# Patient Record
Sex: FEMALE | Race: WHITE | Employment: OTHER | URBAN - METROPOLITAN AREA
[De-identification: names, ages, dates, MRNs, and addresses within clinical notes are randomized per-mention and may not be internally consistent; named-entity substitution may affect disease eponyms.]

---

## 2019-02-27 ENCOUNTER — HOSPITAL ENCOUNTER (OUTPATIENT)
Age: 74
Setting detail: OUTPATIENT SURGERY
Discharge: HOME | End: 2019-02-27
Attending: PODIATRIST | Admitting: PODIATRIST
Payer: MEDICARE

## 2019-02-27 VITALS
RESPIRATION RATE: 18 BRPM | DIASTOLIC BLOOD PRESSURE: 69 MMHG | BODY MASS INDEX: 29.26 KG/M2 | SYSTOLIC BLOOD PRESSURE: 154 MMHG | TEMPERATURE: 98.1 F | HEIGHT: 62 IN | OXYGEN SATURATION: 100 % | HEART RATE: 77 BPM | WEIGHT: 159 LBS

## 2019-02-27 PROBLEM — M89.8X7 EXOSTOSIS OF TOE: Chronic | Status: RESOLVED | Noted: 2019-02-27 | Resolved: 2019-02-27

## 2019-02-27 PROBLEM — M21.621 BUNIONETTE OF RIGHT FOOT: Status: RESOLVED | Noted: 2019-02-27 | Resolved: 2019-02-27

## 2019-02-27 PROBLEM — M21.621 BUNIONETTE OF RIGHT FOOT: Status: ACTIVE | Noted: 2019-02-27

## 2019-02-27 PROBLEM — M89.8X7 EXOSTOSIS OF TOE: Chronic | Status: ACTIVE | Noted: 2019-02-27

## 2019-02-27 PROCEDURE — 77030004410 HC BUR OVL MCRA -B: Performed by: PODIATRIST

## 2019-02-27 PROCEDURE — 76210000050 HC AMBSU PH II REC 0.5 TO 1 HR: Performed by: PODIATRIST

## 2019-02-27 PROCEDURE — 77030032490 HC SLV COMPR SCD KNE COVD -B: Performed by: PODIATRIST

## 2019-02-27 PROCEDURE — 74011000250 HC RX REV CODE- 250: Performed by: PODIATRIST

## 2019-02-27 PROCEDURE — 77030011640 HC PAD GRND REM COVD -A: Performed by: PODIATRIST

## 2019-02-27 PROCEDURE — 76030000001 HC AMB SURG OR TIME 1 TO 1.5: Performed by: PODIATRIST

## 2019-02-27 PROCEDURE — 77030018836 HC SOL IRR NACL ICUM -A: Performed by: PODIATRIST

## 2019-02-27 PROCEDURE — 74011250636 HC RX REV CODE- 250/636: Performed by: PODIATRIST

## 2019-02-27 PROCEDURE — 77030031139 HC SUT VCRL2 J&J -A: Performed by: PODIATRIST

## 2019-02-27 PROCEDURE — 77030002916 HC SUT ETHLN J&J -A: Performed by: PODIATRIST

## 2019-02-27 PROCEDURE — 77030006831 HC BLD SAW SAG MCRA -B: Performed by: PODIATRIST

## 2019-02-27 RX ORDER — LEVOTHYROXINE SODIUM 100 UG/1
50 TABLET ORAL
COMMUNITY

## 2019-02-27 RX ORDER — ROSUVASTATIN CALCIUM 10 MG/1
10 TABLET, COATED ORAL
COMMUNITY

## 2019-02-27 RX ORDER — DEXAMETHASONE SODIUM PHOSPHATE 4 MG/ML
INJECTION, SOLUTION INTRA-ARTICULAR; INTRALESIONAL; INTRAMUSCULAR; INTRAVENOUS; SOFT TISSUE AS NEEDED
Status: DISCONTINUED | OUTPATIENT
Start: 2019-02-27 | End: 2019-02-27 | Stop reason: HOSPADM

## 2019-02-27 RX ORDER — ASPIRIN 81 MG/1
TABLET ORAL DAILY
COMMUNITY

## 2019-02-27 RX ORDER — DM/PE/ACETAMINOPHEN/CHLORPHENR 10-5-325-2
1500 TABLET, SEQUENTIAL ORAL DAILY
COMMUNITY

## 2019-02-27 RX ORDER — LORATADINE 10 MG/1
10 TABLET ORAL
COMMUNITY

## 2019-02-27 RX ORDER — RANITIDINE 150 MG/1
150 CAPSULE ORAL
COMMUNITY

## 2019-02-27 RX ORDER — LISINOPRIL 5 MG/1
5 TABLET ORAL DAILY
COMMUNITY
End: 2021-06-28

## 2019-02-27 NOTE — BRIEF OP NOTE
BRIEF OPERATIVE NOTE    Date of Procedure: 2/27/2019   Preoperative Diagnosis: BUNIONETTE, RIGHT FIFTH TOE  Postoperative Diagnosis: BUNIONETTE, RIGHT FIFTH TOE    Procedure(s):  BUNIONETTE RESECTION 5TH METATARSAL RIGHT FOOT, Exostectomy right fifth toe  Surgeon(s) and Role:     * Isa Lopez DPM - Primary         Surgical Assistant: none    Surgical Staff:  Circ-1: Isaiah Andrews RN  Local Nurse Monitor: Katya Hicks RN  Scrub Tech-1: Shirlene LUIS  Event Time In Time Out   Incision Start 1434    Incision Close 1511      Anesthesia: Local   Estimated Blood Loss: minimal  Specimens: * No specimens in log *   Findings: s/a   Complications: none  Implants: * No implants in log *

## 2019-02-27 NOTE — ROUTINE PROCESS
Patient: Jb Nash MRN: [id number]  SSN: [id number]   YOB: 1945  Age: 74 y.o. Sex: female     Patient is status post Procedure(s):  BUNIONETTE RESECTION 5TH METATARSAL RIGHT FOOT, Exostectomy right fifth toe.     Surgeon(s) and Role:     * LINSEY CowanM - Primary    Local/Dose/Irrigation:  SEE MAR                                         Dressing/Packing:  Wound Foot Right-Dressing Type : (betadine soaked adaptice, 4x4, raphael X 2, ace) (02/27/19 1300)  Splint/Cast:  ]    Other:

## 2019-02-27 NOTE — DISCHARGE INSTRUCTIONS
Flora Albert, MILLY  [address], [address]  Phone [phone] - Fax [phone]    AFTER YOUR SURGERY    CONGRATULATIONS! You have gone through a difficult ordeal.  However, we hope this will mean a marked improvement in the function of your feet. Your help is now needed to speed the healing process by carefully following these instructions. 1. If you have a problem, call the office. We want you to be comfortable. 2. Return home immediately. Although foot is numb and no pain felt, any undue used of foot results in unnecessary bleeding, post operative pain and delayed healing. 3. Rest as much as possible for 2-3 days. You deserve it. Do not work, drive or operate machinery. 4. Elevate the foot. Do not let it dangle; that will cause it to swell. Decrease walking and standing as much as possible; they cause swelling also. 5. An ice bag may be used to help control any discomfort. Place ice pack over the ankle for 15 minute intervals. Do not allow the ice pack to leak water on the bandages. 6. Call the office day or night if:  1. Bleeding is active or persistent. If a little bleeding comes through the bandage, do not worry. 2. You should bump or injure your foot. 3. If medication does not relieve discomfort. 4. If your toes appear blue or feel cold. 7. Do not loosen or remove or alter surgical dressings under any circumstances without first consulting the doctor - do not bathe foot or allow bandage to become wet - this may result in infection or retard healing. 8. When you go to sleep, lossen the be sheets. You may want to lay a box on its side and place your foot inside of it. This way your sheet will not irritate the surgical site. 9. Numbness can last from 6-40 hours. 10. Eat a well balanced diet and drink fruit juices for the next three weeks. Avoid excessive salt and salty food; they cause swelling.   11. For any questions you may have, remember we are as near as the telephone. DO NOT HESITATE TO CALL IF YOUR FEEL SOMETHING IS WRONG. PLEASE. Call [phone]. After business hours please call my cell phone [phone].    ______________________________________________________________________    Anesthesia Discharge Instructions    After general anesthesia or intervenous sedation, for 24 hours or while taking prescription Narcotics:  · Limit your activities  · Do not drive or operate hazardous machinery  · If you have not urinated within 8 hours after discharge, please contact your surgeon on call. · Do not make important personal or business decisions  · Do not drink alcoholic beverages    Report the following to your surgeon:  · Excessive pain, swelling, redness or odor of or around the surgical area  · Temperature over 100.5 degrees  · Nausea and vomiting lasting longer than 4 hours or if unable to take medication  · Any signs of decreased circulation or nerve impairment to extremity:  Change in color, persistent numbness, tingling, coldness or increased pain.   · Any questions

## 2019-02-28 NOTE — OP NOTES
[address]   OPERATIVE REPORT    Name:  Jeremie Martin  MR#:  [id number]  :  1945  ACCOUNT #:  [de-identified]  DATE OF SERVICE:  2019    SITE:  [facility]    PREOPERATIVE DIAGNOSES:  Right foot bunionette deformity and exostosis, fifth toe of the right foot. POSTOPERATIVE DIAGNOSES:  Right foot bunionette deformity and exostosis, fifth toe of the right foot. PROCEDURE PERFORMED:  Resection of bunionette-overbone fifth metatarsal right and exostosectomy, fifth toe right. SURGEON:  Cristina Ward DPM    ASSISTANT:  None. ANESTHESIA:  Local consisting of 0.25% Marcaine with 1% lidocaine with a total dilution of epinephrine of 1:400,000. COMPLICATIONS:  None. SPECIMENS REMOVED:  None. IMPLANTS:  None. ESTIMATED BLOOD LOSS:  Minimal.    INDICATIONS:  The patient has presented with history of painful bunionette and digital exostosis, fifth toe of the right foot. She has pain with conventional shoe gear which has progressively gotten worse. Definitive care has been requested. Surgery was discussed in detail including a review of the preoperative x-rays and planned procedure. Risks, complications, and postoperative course were fully discussed with the patient. The patient had the opportunity to ask questions and all questions were answered to her satisfaction. PROCEDURE:  The patient was brought to the operating room and placed in the supine position. After the patient was locally anesthetized, the right foot was prepped and draped in the usual aseptic sterile manner. Attention was directed to the dorsal aspect of the fifth metatarsophalangeal joint, where a 2.5 cm curvilinear incision was placed overlying the head of the metatarsal.  The incision was deepened via sharp dissection. All bleeders were encountered, were clamped and ligated as necessary. All neurovascular structures were meticulously identified, preserved, and retracted.   The capsular tissue overlying the metatarsophalangeal joint was incised in a linear fashion. The upper bone of the fifth metatarsal was resected with sagittal saw. All bony parameters including lateral bone of fifth metatarsal and intermetatarsal angle were within normal limits. All remaining bone was rasped smooth. The wound was then flushed copiously with sterile saline. The capsular tissue was reapproximated in the bias fashion from distal lateral to proximal medial to reinforce the joint laterally. The subcutaneous tissues were reapproximated with the use of 4-0 Vicryl and the skin was closed with the use of 4-0 nylon suture. Attention was now directed to the dorsal aspect of the fifth toe, where a 0.5 cm longitudinal incision was placed directly overlying the palpable bony prominence at the distal joint. The incision was deepened via sharp dissection. With the use of a [address] [place] blade, the bony prominence was meticulously exposed and reduced with a bone file. The wound was then flushed copiously with sterile saline and closed with 4-0 nylon suture. The wounds were then dressed with Betadine impregnated Adaptic gauze and clean dressing. Both oral and written postoperative instructions were given to the patient. A surgical shoe was dispensed and prescription for Norco 5 mg p.r.n. pain was prescribed. Followup will be in the office.       Man Huang DPM [id number]  D:  02/28/2019 6:32  T:  02/28/2019 13:05  JOB #:  [id number]  CC:  Juanita Burdick MD

## 2019-11-15 ENCOUNTER — HOSPITAL ENCOUNTER (OUTPATIENT)
Dept: GENERAL RADIOLOGY | Age: 74
Discharge: HOME | End: 2019-11-15
Attending: FAMILY MEDICINE
Payer: MEDICARE

## 2019-11-15 DIAGNOSIS — R05.9 COUGH: ICD-10-CM

## 2019-11-15 PROCEDURE — 71046 X-RAY EXAM CHEST 2 VIEWS: CPT

## 2021-06-22 ENCOUNTER — OFFICE VISIT (OUTPATIENT)
Dept: CARDIOLOGY CLINIC | Age: 76
End: 2021-06-22
Payer: MEDICARE

## 2021-06-22 VITALS
OXYGEN SATURATION: 99 % | RESPIRATION RATE: 16 BRPM | BODY MASS INDEX: 29.41 KG/M2 | HEIGHT: 63 IN | SYSTOLIC BLOOD PRESSURE: 128 MMHG | HEART RATE: 70 BPM | WEIGHT: 166 LBS | DIASTOLIC BLOOD PRESSURE: 80 MMHG

## 2021-06-22 DIAGNOSIS — Z82.49 FAMILY HISTORY OF EARLY CAD: ICD-10-CM

## 2021-06-22 DIAGNOSIS — I10 BENIGN ESSENTIAL HTN: ICD-10-CM

## 2021-06-22 DIAGNOSIS — I20.0 UNSTABLE ANGINA (HCC): Primary | ICD-10-CM

## 2021-06-22 DIAGNOSIS — R00.2 PALPITATIONS: ICD-10-CM

## 2021-06-22 PROCEDURE — G8536 NO DOC ELDER MAL SCRN: HCPCS | Performed by: INTERNAL MEDICINE

## 2021-06-22 PROCEDURE — 93005 ELECTROCARDIOGRAM TRACING: CPT | Performed by: INTERNAL MEDICINE

## 2021-06-22 PROCEDURE — G8400 PT W/DXA NO RESULTS DOC: HCPCS | Performed by: INTERNAL MEDICINE

## 2021-06-22 PROCEDURE — G8510 SCR DEP NEG, NO PLAN REQD: HCPCS | Performed by: INTERNAL MEDICINE

## 2021-06-22 PROCEDURE — 93010 ELECTROCARDIOGRAM REPORT: CPT | Performed by: INTERNAL MEDICINE

## 2021-06-22 PROCEDURE — G8754 DIAS BP LESS 90: HCPCS | Performed by: INTERNAL MEDICINE

## 2021-06-22 PROCEDURE — 99204 OFFICE O/P NEW MOD 45 MIN: CPT | Performed by: INTERNAL MEDICINE

## 2021-06-22 PROCEDURE — G8752 SYS BP LESS 140: HCPCS | Performed by: INTERNAL MEDICINE

## 2021-06-22 PROCEDURE — G8427 DOCREV CUR MEDS BY ELIG CLIN: HCPCS | Performed by: INTERNAL MEDICINE

## 2021-06-22 PROCEDURE — 1090F PRES/ABSN URINE INCON ASSESS: CPT | Performed by: INTERNAL MEDICINE

## 2021-06-22 PROCEDURE — G8419 CALC BMI OUT NRM PARAM NOF/U: HCPCS | Performed by: INTERNAL MEDICINE

## 2021-06-22 PROCEDURE — G0463 HOSPITAL OUTPT CLINIC VISIT: HCPCS | Performed by: INTERNAL MEDICINE

## 2021-06-22 PROCEDURE — 1101F PT FALLS ASSESS-DOCD LE1/YR: CPT | Performed by: INTERNAL MEDICINE

## 2021-06-22 RX ORDER — LEVOTHYROXINE SODIUM 50 UG/1
TABLET ORAL
COMMUNITY
Start: 2021-04-05

## 2021-06-22 RX ORDER — LORATADINE 10 MG/1
TABLET ORAL
COMMUNITY
Start: 2021-04-05

## 2021-06-22 RX ORDER — LOSARTAN POTASSIUM 50 MG/1
TABLET ORAL
COMMUNITY
Start: 2021-04-05

## 2021-06-22 RX ORDER — ASPIRIN/OMEPRAZOLE 81 MG-40MG
TABLET,IMMEDIATE,DELAY RELEASE,BIPHASE ORAL
COMMUNITY

## 2021-06-22 RX ORDER — ROSUVASTATIN CALCIUM 10 MG/1
TABLET, FILM COATED ORAL
COMMUNITY
Start: 2021-04-05

## 2021-06-22 RX ORDER — BESIFLOXACIN 6 MG/ML
SUSPENSION OPHTHALMIC
COMMUNITY
Start: 2021-05-04

## 2021-06-22 RX ORDER — CHOLECALCIFEROL TAB 125 MCG (5000 UNIT) 125 MCG
5000 TAB ORAL DAILY
COMMUNITY

## 2021-06-22 NOTE — PROGRESS NOTES
[address]: Harris Amber  [id number]    History of Present Illness:  Ms. Raimundo Camacho is a 63 yo F with history of hypertension, hypothyroid, family history of early coronary artery disease, remote tobacco use quit many years ago, sounds like possible TIA three years ago, referred by her primary care for cardiac evaluation. She does have upcoming cataract surgery on 07/07/2021 and 07/21/2021 with Dr. Safia Mora. She is here due to progressive shortness of breath over the last few months, predominately when she is going up stairs and sometimes when she is swimming in the pool. She denies any dung chest pain. She just has rare palpitations with heart \"fluttering\" once every few months. They did think possibly in the past she had atrial fibrillation, but this was never documented or found. She denies any prior cardiac history, but has family history of early coronary artery disease. She is compensated on exam with clear lungs and no lower extremity edema. Soc hx. Quit tobacco 30 yrs ago. Retired,  was in the [employer]. Fam hx. Brother 62s CAD, younger brother CAD. Assessment and Plan:   1. Preoperative cardiac evaluation. Overall, she is stable cardiac wise and low risk for cardiac complications. Will send a clearance letter to Dr. Safia Mora and Dr. Aarti Diaz. She can proceed with her cataract surgery. 2. Unstable angina. She does have some dyspnea on exertion, though that is unexplained and will set up a treadmill stress echocardiogram for further evaluation. I do not think that this needs to be done prior to surgery, but regardless will try to schedule it within the next week. So this will be completed prior to surgery. 3. Palpitations. These are rare and infrequent. If in the future this becomes more consistent then could always consider heart monitor, but again this is occurring once every few months. 4. Essential hypertension. Blood pressure is controlled.     5. Family history of early coronary artery disease. She  has no past medical history on file. All other systems negative except as above. PE  Vitals:    06/22/21 0907   Pulse: 70   Resp: 16   SpO2: 99%   Weight: 166 lb (75.3 kg)   Height: 5' 2.5\" (1.588 m)    Body mass index is 29.88 kg/m². General appearance - alert, well appearing, and in no distress  Mental status - affect appropriate to mood  Eyes - sclera anicteric, moist mucous membranes  Neck - supple, no JVD  Chest - clear to auscultation, no wheezes, rales or rhonchi  Heart - normal rate, regular rhythm, normal S1, S2, no murmurs, rubs, clicks or gallops  Abdomen - soft, nontender, nondistended, no masses or organomegaly  Neurological -  no focal deficit  Extremities - peripheral pulses normal, no pedal edema      Recent Labs:  No results found for: CHOL, CHOLX, CHLST, CHOLV, 607828, HDL, HDLP, LDL, LDLC, DLDLP, TGLX, TRIGL, TRIGP, CHHD, CHHDX  No results found for: KAVYA, CREAPOC, ACREA, CREA, REFC3, REFC4  No results found for: BUN, BUNPOC, IBUN, MBUNV, BUNV  No results found for: K, KI, PLK, WBK  No results found for: HBA1C, HZZ8HLLQ, DBA3XHQC  No results found for: HGBPOC, HGB, HGBP, HGBEXT, HGBEXT  No results found for: PLT, PLTEXT, PLTEXT    Reviewed:  No past medical history on file. Social History     Tobacco Use   Smoking Status Former Smoker    Types: Cigarettes   Smokeless Tobacco Never Used     Social History     Substance and Sexual Activity   Alcohol Use None     Not on File    Current Outpatient Medications   Medication Sig    Besivance 0.6 % drps ophthalmic suspension     levothyroxine (SYNTHROID) 50 mcg tablet     loratadine (CLARITIN) 10 mg tablet     Crestor 10 mg tablet     losartan (COZAAR) 50 mg tablet     cholecalciferol (VITAMIN D3) (5000 Units/125 mcg) tab tablet Take 5,000 Units by mouth daily.  aspirin-omeprazole 81-40 mg TbID Take  by mouth.     fish oil-omega-3 fatty acids 340-1,000 mg capsule Take 1 Capsule by mouth daily.    Bifidobacterium infantis (ALIGN PO) Take  by mouth. No current facility-administered medications for this visit.        Lucinda Mao MD  [facility] heart and Vascular [place]  [address] [facility] [address]  [address]

## 2021-06-22 NOTE — LETTER
Patient:  Shekhar Nuno YOB: 1945 Date of Visit: 6/22/2021 Dear MD Nathan Perdomo  [address] [place] [address] [address] Fax: [phone] Pia Hurst MD 
[address] [facility] [address] [facility] [address] Via Fax: [phone]: Thank you for referring Ms. Shekhar Nuno to me for evaluation/treatment. Below are the relevant portions of my assessment and plan of care. Ms. Claudy Vines is a 63 yo F with history of hypertension, hypothyroid, family history of early coronary artery disease, remote tobacco use quit many years ago, sounds like possible TIA three years ago, referred by her primary care for cardiac evaluation. She does have upcoming cataract surgery on 07/07/2021 and 07/21/2021 with Dr. Pia Hurst. She is here due to progressive shortness of breath over the last few months, predominately when she is going up stairs and sometimes when she is swimming in the pool. She denies any dung chest pain. She just has rare palpitations with heart \"fluttering\" once every few months. They did think possibly in the past she had atrial fibrillation, but this was never documented or found. She denies any prior cardiac history, but has family history of early coronary artery disease. She is compensated on exam with clear lungs and no lower extremity edema. Soc hx. Quit tobacco 30 yrs ago. Retired,  was in the [employer]. Fam hx. Brother 62s CAD, younger brother CAD. Assessment and Plan: 1. Preoperative cardiac evaluation. Overall, she is stable cardiac wise and low risk for cardiac complications. Will send a clearance letter to Dr. Pia Hurst and Dr. Hair Edward. She can proceed with her cataract surgery. 2. Unstable angina. She does have some dyspnea on exertion, though that is unexplained and will set up a treadmill stress echocardiogram for further evaluation.   I do not think that this needs to be done prior to surgery, but regardless will try to schedule it within the next week. So this will be completed prior to surgery. 3. Palpitations. These are rare and infrequent. If in the future this becomes more consistent then could always consider heart monitor, but again this is occurring once every few months. 4. Essential hypertension. Blood pressure is controlled. 5. Family history of early coronary artery disease. If you have questions, please do not hesitate to call me. Sincerely, Emmy Gilman MD

## 2021-06-28 ENCOUNTER — HOSPITAL ENCOUNTER (OUTPATIENT)
Dept: NON INVASIVE DIAGNOSTICS | Age: 76
Discharge: HOME | End: 2021-06-28
Attending: INTERNAL MEDICINE
Payer: MEDICARE

## 2021-06-28 VITALS — HEIGHT: 63 IN | BODY MASS INDEX: 29.41 KG/M2 | WEIGHT: 166 LBS

## 2021-06-28 DIAGNOSIS — I20.0 UNSTABLE ANGINA (HCC): ICD-10-CM

## 2021-06-28 LAB
ECHO TV REGURGITANT MAX VELOCITY: 216.36 CM/S
ECHO TV REGURGITANT PEAK GRADIENT: 18.72 MMHG
STRESS ANGINA INDEX: 0
STRESS BASELINE DIAS BP: 77 MMHG
STRESS BASELINE HR: 72 BPM
STRESS BASELINE SYS BP: 157 MMHG
STRESS ESTIMATED WORKLOAD: 5.1 METS
STRESS EXERCISE DUR MIN: NORMAL
STRESS PEAK DIAS BP: 90 MMHG
STRESS PEAK SYS BP: 166 MMHG
STRESS PERCENT HR ACHIEVED: 106 %
STRESS POST PEAK HR: 151 BPM
STRESS RATE PRESSURE PRODUCT: NORMAL BPM*MMHG
STRESS STAGE 1 BP: NORMAL MMHG
STRESS STAGE 1 HR: 144 BPM
STRESS STAGE 2 HR: 148 BPM
STRESS STAGE RECOVERY 1 BP: NORMAL MMHG
STRESS STAGE RECOVERY 1 HR: 72 BPM
STRESS TARGET HR: 142 BPM

## 2021-06-28 PROCEDURE — 93320 DOPPLER ECHO COMPLETE: CPT

## 2021-06-28 RX ORDER — HYOSCYAMINE SULFATE 0.12 MG/1
0.12 TABLET SUBLINGUAL
COMMUNITY

## 2021-07-02 ENCOUNTER — TELEPHONE (OUTPATIENT)
Dept: CARDIOLOGY CLINIC | Age: 76
End: 2021-07-02

## 2021-07-02 NOTE — TELEPHONE ENCOUNTER
----- Message from Julia He MD sent at 6/28/2021  2:22 PM EDT -----  Please let pt know stress test was overall normal. Recommend follow up in 6-12 months for surveillance.  thx

## 2021-07-02 NOTE — TELEPHONE ENCOUNTER
Patient returned call. Two patient indentifiers verified. Pt was informed of results. Pt stated that she will call back to scheduled follow up appointment.

## 2022-11-30 ENCOUNTER — OFFICE VISIT (OUTPATIENT)
Dept: CARDIOLOGY CLINIC | Age: 77
End: 2022-11-30
Payer: MEDICARE

## 2022-11-30 VITALS
BODY MASS INDEX: 29.45 KG/M2 | HEIGHT: 63 IN | WEIGHT: 166.2 LBS | DIASTOLIC BLOOD PRESSURE: 86 MMHG | RESPIRATION RATE: 14 BRPM | OXYGEN SATURATION: 98 % | SYSTOLIC BLOOD PRESSURE: 132 MMHG

## 2022-11-30 DIAGNOSIS — Z82.49 FAMILY HISTORY OF EARLY CAD: ICD-10-CM

## 2022-11-30 DIAGNOSIS — R00.2 HEART PALPITATIONS: Primary | ICD-10-CM

## 2022-11-30 DIAGNOSIS — I10 BENIGN ESSENTIAL HTN: ICD-10-CM

## 2022-11-30 RX ORDER — LEVOTHYROXINE SODIUM 75 UG/1
TABLET ORAL
COMMUNITY

## 2022-11-30 NOTE — LETTER
Patient:  Asia Baez   YOB: 1945  Date of Visit: 11/30/2022      Dear MD Nilay [address] [address]  Via Fax: [phone]:      Ms. Dipti Costa is a 75 yo F with history of hypertension, hypothyroid, family history of early coronary artery disease, remote tobacco use quit many years ago, sounds like possible TIA three years ago. She is here now due to palpitations. They have been occurring a few times over this past week. In particular on 11/20/2022 she awoke and had heavy \"fluttering\" that lasted about an hour. Since then, she has had some recurrent spells. These can happen with no clear triggers or exacerbating factors. She denies any exertional chest pain. Her breathing has been stable. She does go to the gym five days a week. 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 She also saw ENT. She is compensated on exam with clear lungs and no lower extremity edema. Her EKG here is normal sinus rhythm. There is a single PVC. Soc hx. Quit tobacco 30 yrs ago. Retired,  was in the [employer]. Fam hx. Brother 62s CAD, younger brother CAD. Assessment and Plan:   1. Palpitations. Will have her wear a one week loop monitor to evaluate for possible arrhythmia. She did have a PVC on her EKG here, but this was asymptomatic. 2. Essential hypertension. Blood pressure is controlled. 3. Family history of early coronary artery disease. If you have questions, please do not hesitate to call me.      Sincerely,      Wilmar Cardona MD

## 2022-11-30 NOTE — PROGRESS NOTES
[address]: Winnie [place]  ([phone]    History of Present Illness:  Ms. Mihaela Sunshine is a 75 yo F with history of hypertension, hypothyroid, family history of early coronary artery disease, remote tobacco use quit many years ago, sounds like possible TIA three years ago. She is here now due to palpitations. They have been occurring a few times over this past week. In particular on 11/20/2022 she awoke and had heavy \"fluttering\" that lasted about an hour. Since then, she has had some recurrent spells. These can happen with no clear triggers or exacerbating factors. She denies any exertional chest pain. Her breathing has been stable. She does go to the gym five days a week. 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 She also saw ENT. She is compensated on exam with clear lungs and no lower extremity edema. Her EKG here is normal sinus rhythm. There is a single PVC. Soc hx. Quit tobacco 30 yrs ago. Retired,  was in the [employer]. Fam hx. Brother 62s CAD, younger brother CAD. Assessment and Plan:   1. Palpitations. Will have her wear a one week loop monitor to evaluate for possible arrhythmia. She did have a PVC on her EKG here, but this was asymptomatic. 2. Essential hypertension. Blood pressure is controlled. 3. Family history of early coronary artery disease. She  has a past medical history of Bunionette of right foot, Exostosis of toe (2/27/2019), GERD (gastroesophageal reflux disease), and Hypertension. All other systems negative except as above. PE  Vitals:    11/30/22 1150   BP: 132/86   Resp: 14   SpO2: 98%   Weight: 166 lb 3.2 oz (75.4 kg)   Height: 5' 2.5\" (1.588 m)      Body mass index is 29.91 kg/m².    General appearance - alert, well appearing, and in no distress  Mental status - affect appropriate to mood  Eyes - sclera anicteric, moist mucous membranes  Neck - supple, no JVD  Chest - clear to auscultation, no wheezes, rales or rhonchi  Heart - normal rate, regular rhythm, normal S1, S2, no murmurs, rubs, clicks or gallops  Abdomen - soft, nontender, nondistended, no masses or organomegaly  Neurological -  no focal deficit  Extremities - peripheral pulses normal, no pedal edema      Recent Labs:  No results found for: CHOL, CHOLX, CHLST, CHOLV, 439724, HDL, HDLP, LDL, LDLC, DLDLP, TGLX, TRIGL, TRIGP, CHHD, CHHDX  No results found for: KAVYA, CREAPOC, [facility], CREA, REFC3, REFC4  No results found for: BUN, BUNPOC, IBUN, MBUNV, BUNV  No results found for: K, KI, PLK, WBK  No results found for: HBA1C, OIU0PNVJ, FKQ2UDCV  No results found for: HGBPOC, HGB, HGBP, HGBEXT, HGBEXT  No results found for: PLT, PLTEXT, PLTEXT    Reviewed:  Past Medical History:   Diagnosis Date    Bunionette of right foot     Exostosis of toe 2/27/2019    GERD (gastroesophageal reflux disease)     Hypertension      Social History     Tobacco Use   Smoking Status Former    Types: Cigarettes   Smokeless Tobacco Never     Social History     Substance and Sexual Activity   Alcohol Use Yes     No Known Allergies    Current Outpatient Medications   Medication Sig    levothyroxine (SYNTHROID) 75 mcg tablet Take  by mouth Daily (before breakfast). hyoscyamine SL (LEVSIN/SL) 0.125 mg SL tablet 0.125 mg by SubLINGual route every four (4) hours as needed. rifAXIMin (XIFAXAN) 550 mg tablet Take 550 mg by mouth three (3) times daily. Besivance 0.6 % drps ophthalmic suspension     Crestor 10 mg tablet     losartan (COZAAR) 50 mg tablet     cholecalciferol (VITAMIN D3) (5000 Units/125 mcg) tab tablet Take 5,000 Units by mouth daily. aspirin-omeprazole 81-40 mg TbID Take  by mouth. Bifidobacterium infantis (ALIGN PO) Take  by mouth.    loratadine (CLARITIN) 10 mg tablet Take 10 mg by mouth. rosuvastatin (CRESTOR) 10 mg tablet Take 10 mg by mouth every three (3) days.     raNITIdine hcl 150 mg capsule Take 150 mg by mouth daily as needed for Indigestion. aspirin delayed-release 81 mg tablet Take  by mouth daily. fish oil-omega-3 fatty acids 340-1,000 mg capsule Take 1 Cap by mouth daily. glucosamine 1,000 mg tab Take 1,500 mg by mouth daily. levothyroxine (SYNTHROID) 50 mcg tablet     loratadine (CLARITIN) 10 mg tablet  (Patient not taking: Reported on 11/30/2022)    fish oil-omega-3 fatty acids 340-1,000 mg capsule Take 1 Capsule by mouth daily. (Patient not taking: Reported on 11/30/2022)    levothyroxine (SYNTHROID) 100 mcg tablet Take 50 mcg by mouth Daily (before breakfast). No current facility-administered medications for this visit.        Silvia Diop MD  [facility] heart and Vascular [place]  [facility] [address] [facility] [address]  [address]

## 2022-11-30 NOTE — PATIENT INSTRUCTIONS
A 7 day loop monitor has been ordered for you. This will be mailed to the address given to us in the next 5-7 business days. Please read over the instructions given to you about [employer] loop monitors. If you have any insurance questions regarding coverage and out of pocket cost please contact the number below. If you have any billing questions regarding deductible or responsibility call [phone]. If you need additional supplies or issue with your monitor please call [phone].

## 2022-12-01 ENCOUNTER — TELEPHONE (OUTPATIENT)
Dept: CARDIOLOGY CLINIC | Age: 77
End: 2022-12-01

## 2022-12-01 NOTE — TELEPHONE ENCOUNTER
Enrolled with [employer] - Ordered and being shipped to patient's home address on file. ETA within 5-7 business days. Message  Received: Chari Mcleod, RN  Elke Pineda  Please send a 1 week loop monitor for palps. . thanks!

## 2022-12-29 ENCOUNTER — TELEPHONE (OUTPATIENT)
Dept: CARDIOLOGY CLINIC | Age: 77
End: 2022-12-29

## 2022-12-29 NOTE — TELEPHONE ENCOUNTER
Left message for patient to return call in regards to recent testing. Please let pt know holter was overall normal. Just rare to occasional benign PVCs. No specific therapy needed for these.  thx

## 2022-12-29 NOTE — TELEPHONE ENCOUNTER
Pt rtc to the medical assistant or nurse.     [phone] PAST MEDICAL HISTORY:  Endometriosis     Miscarriage     Ovarian cyst     Urinary tract infection associated with cystostomy catheter, sequela

## 2023-01-30 ENCOUNTER — TELEPHONE (OUTPATIENT)
Dept: CARDIOLOGY CLINIC | Age: 78
End: 2023-01-30

## 2023-01-30 NOTE — TELEPHONE ENCOUNTER
Received request to fax office note of 11-30-22 to Sylwia Chavira MD @ [phone]. Confirmation received.

## 2023-11-16 ENCOUNTER — TRANSCRIBE ORDERS (OUTPATIENT)
Facility: HOSPITAL | Age: 78
End: 2023-11-16

## 2023-11-16 DIAGNOSIS — M79.671 RIGHT FOOT PAIN: ICD-10-CM

## 2023-11-16 DIAGNOSIS — M79.672 LEFT FOOT PAIN: Primary | ICD-10-CM

## 2023-11-16 DIAGNOSIS — M76.812 ANTERIOR TIBIAL TENDONITIS, LEFT: ICD-10-CM

## 2023-11-17 ENCOUNTER — HOSPITAL ENCOUNTER (OUTPATIENT)
Facility: HOSPITAL | Age: 78
End: 2023-11-17
Attending: PODIATRIST
Payer: MEDICARE

## 2023-11-17 DIAGNOSIS — M76.812 ANTERIOR TIBIAL TENDONITIS, LEFT: ICD-10-CM

## 2023-11-17 DIAGNOSIS — M79.671 RIGHT FOOT PAIN: ICD-10-CM

## 2023-11-17 DIAGNOSIS — M79.672 LEFT FOOT PAIN: ICD-10-CM

## 2023-11-17 PROCEDURE — 73718 MRI LOWER EXTREMITY W/O DYE: CPT

## 2024-04-16 PROBLEM — Z87.19 HISTORY OF GASTROESOPHAGEAL REFLUX (GERD): Status: ACTIVE | Noted: 2024-04-16

## 2024-04-16 PROBLEM — E07.9 THYROID DISEASE: Status: ACTIVE | Noted: 2024-04-16

## 2024-04-16 PROBLEM — E78.00 HYPERCHOLESTEROLEMIA: Status: ACTIVE | Noted: 2024-04-16

## 2024-04-16 PROBLEM — I25.10 CARDIOVASCULAR DISEASE: Status: ACTIVE | Noted: 2024-04-16

## 2024-04-16 PROBLEM — M17.11 OSTEOARTHRITIS OF RIGHT KNEE: Status: ACTIVE | Noted: 2024-04-16

## 2024-05-21 ENCOUNTER — TRANSCRIBE ORDERS (OUTPATIENT)
Facility: HOSPITAL | Age: 79
End: 2024-05-21

## 2024-05-21 ENCOUNTER — HOSPITAL ENCOUNTER (OUTPATIENT)
Facility: HOSPITAL | Age: 79
Discharge: HOME | End: 2024-05-24
Payer: MEDICARE

## 2024-05-21 DIAGNOSIS — M54.50 LOW BACK PAIN, UNSPECIFIED BACK PAIN LATERALITY, UNSPECIFIED CHRONICITY, UNSPECIFIED WHETHER SCIATICA PRESENT: Primary | ICD-10-CM

## 2024-05-21 DIAGNOSIS — M54.50 LOW BACK PAIN, UNSPECIFIED BACK PAIN LATERALITY, UNSPECIFIED CHRONICITY, UNSPECIFIED WHETHER SCIATICA PRESENT: ICD-10-CM

## 2024-05-21 PROCEDURE — 72220 X-RAY EXAM SACRUM TAILBONE: CPT

## 2024-09-13 ENCOUNTER — HOSPITAL ENCOUNTER (OUTPATIENT)
Facility: HOSPITAL | Age: 79
Discharge: HOME | End: 2024-09-16
Payer: MEDICARE

## 2024-09-13 DIAGNOSIS — Z12.31 SCREENING MAMMOGRAM FOR HIGH-RISK PATIENT: ICD-10-CM

## 2024-09-13 PROCEDURE — 77063 BREAST TOMOSYNTHESIS BI: CPT

## 2024-09-25 ENCOUNTER — TRANSCRIBE ORDERS (OUTPATIENT)
Facility: HOSPITAL | Age: 79
End: 2024-09-25

## 2024-09-25 DIAGNOSIS — Z12.31 VISIT FOR SCREENING MAMMOGRAM: Primary | ICD-10-CM

## 2025-01-22 ENCOUNTER — HOSPITAL ENCOUNTER (OUTPATIENT)
Facility: HOSPITAL | Age: 80
Setting detail: OBSERVATION
Discharge: HOME | End: 2025-01-23
Attending: STUDENT IN AN ORGANIZED HEALTH CARE EDUCATION/TRAINING PROGRAM | Admitting: FAMILY MEDICINE
Payer: MEDICARE

## 2025-01-22 ENCOUNTER — APPOINTMENT (OUTPATIENT)
Facility: HOSPITAL | Age: 80
End: 2025-01-22
Payer: MEDICARE

## 2025-01-22 DIAGNOSIS — R55 NEAR SYNCOPE: Primary | ICD-10-CM

## 2025-01-22 LAB
ALBUMIN SERPL-MCNC: 3.7 G/DL (ref 3.5–5)
ALBUMIN/GLOB SERPL: 1.3 (ref 1.1–2.2)
ALP SERPL-CCNC: 52 U/L (ref 45–117)
ALT SERPL-CCNC: 35 U/L (ref 12–78)
ANION GAP SERPL CALC-SCNC: 14 MMOL/L (ref 2–12)
APPEARANCE UR: CLEAR
AST SERPL-CCNC: 19 U/L (ref 15–37)
BACTERIA URNS QL MICRO: NEGATIVE /HPF
BASOPHILS # BLD: 0.04 K/UL (ref 0–0.1)
BASOPHILS NFR BLD: 0.4 % (ref 0–1)
BILIRUB SERPL-MCNC: 0.7 MG/DL (ref 0.2–1)
BILIRUB UR QL: NEGATIVE
BUN SERPL-MCNC: 33 MG/DL (ref 6–20)
BUN/CREAT SERPL: 36 (ref 12–20)
CALCIUM SERPL-MCNC: 9 MG/DL (ref 8.5–10.1)
CHLORIDE SERPL-SCNC: 103 MMOL/L (ref 97–108)
CO2 SERPL-SCNC: 25 MMOL/L (ref 21–32)
COLOR UR: ABNORMAL
COMMENT:: NORMAL
CREAT SERPL-MCNC: 0.91 MG/DL (ref 0.55–1.02)
D DIMER PPP FEU-MCNC: <0.19 MG/L FEU (ref 0–0.65)
DIFFERENTIAL METHOD BLD: ABNORMAL
EOSINOPHIL # BLD: 0.09 K/UL (ref 0–0.4)
EOSINOPHIL NFR BLD: 0.9 % (ref 0–7)
EPITH CASTS URNS QL MICRO: ABNORMAL /LPF
ERYTHROCYTE [DISTWIDTH] IN BLOOD BY AUTOMATED COUNT: 12.6 % (ref 11.5–14.5)
FLUAV RNA SPEC QL NAA+PROBE: NOT DETECTED
FLUBV RNA SPEC QL NAA+PROBE: NOT DETECTED
GLOBULIN SER CALC-MCNC: 2.8 G/DL (ref 2–4)
GLUCOSE SERPL-MCNC: 155 MG/DL (ref 65–100)
GLUCOSE UR STRIP.AUTO-MCNC: NEGATIVE MG/DL
HCT VFR BLD AUTO: 40.8 % (ref 35–47)
HGB BLD-MCNC: 13.6 G/DL (ref 11.5–16)
HGB UR QL STRIP: NEGATIVE
HYALINE CASTS URNS QL MICRO: ABNORMAL /LPF (ref 0–5)
IMM GRANULOCYTES # BLD AUTO: 0.05 K/UL (ref 0–0.04)
IMM GRANULOCYTES NFR BLD AUTO: 0.5 % (ref 0–0.5)
KETONES UR QL STRIP.AUTO: NEGATIVE MG/DL
LEUKOCYTE ESTERASE UR QL STRIP.AUTO: ABNORMAL
LYMPHOCYTES # BLD: 2.35 K/UL (ref 0.8–3.5)
LYMPHOCYTES NFR BLD: 24.5 % (ref 12–49)
MCH RBC QN AUTO: 30.7 PG (ref 26–34)
MCHC RBC AUTO-ENTMCNC: 33.3 G/DL (ref 30–36.5)
MCV RBC AUTO: 92.1 FL (ref 80–99)
MONOCYTES # BLD: 0.6 K/UL (ref 0–1)
MONOCYTES NFR BLD: 6.3 % (ref 5–13)
NEUTS SEG # BLD: 6.47 K/UL (ref 1.8–8)
NEUTS SEG NFR BLD: 67.4 % (ref 32–75)
NITRITE UR QL STRIP.AUTO: NEGATIVE
NRBC # BLD: 0 K/UL (ref 0–0.01)
NRBC BLD-RTO: 0 PER 100 WBC
PH UR STRIP: 6 (ref 5–8)
PLATELET # BLD AUTO: 144 K/UL (ref 150–400)
PMV BLD AUTO: 11.9 FL (ref 8.9–12.9)
POTASSIUM SERPL-SCNC: 3.7 MMOL/L (ref 3.5–5.1)
PROT SERPL-MCNC: 6.5 G/DL (ref 6.4–8.2)
PROT UR STRIP-MCNC: ABNORMAL MG/DL
RBC # BLD AUTO: 4.43 M/UL (ref 3.8–5.2)
RBC #/AREA URNS HPF: ABNORMAL /HPF (ref 0–5)
SARS-COV-2 RNA RESP QL NAA+PROBE: NOT DETECTED
SODIUM SERPL-SCNC: 142 MMOL/L (ref 136–145)
SOURCE: NORMAL
SP GR UR REFRACTOMETRY: 1.02 (ref 1–1.03)
SPECIMEN HOLD: NORMAL
TROPONIN I SERPL HS-MCNC: 5 NG/L (ref 0–51)
TROPONIN I SERPL HS-MCNC: 5 NG/L (ref 0–51)
URINE CULTURE IF INDICATED: ABNORMAL
UROBILINOGEN UR QL STRIP.AUTO: 0.2 EU/DL (ref 0.2–1)
WBC # BLD AUTO: 9.6 K/UL (ref 3.6–11)
WBC URNS QL MICRO: ABNORMAL /HPF (ref 0–4)

## 2025-01-22 PROCEDURE — 81001 URINALYSIS AUTO W/SCOPE: CPT

## 2025-01-22 PROCEDURE — 84484 ASSAY OF TROPONIN QUANT: CPT

## 2025-01-22 PROCEDURE — 87636 SARSCOV2 & INF A&B AMP PRB: CPT

## 2025-01-22 PROCEDURE — G0378 HOSPITAL OBSERVATION PER HR: HCPCS

## 2025-01-22 PROCEDURE — 2580000003 HC RX 258: Performed by: FAMILY MEDICINE

## 2025-01-22 PROCEDURE — 96361 HYDRATE IV INFUSION ADD-ON: CPT

## 2025-01-22 PROCEDURE — 71046 X-RAY EXAM CHEST 2 VIEWS: CPT

## 2025-01-22 PROCEDURE — 93005 ELECTROCARDIOGRAM TRACING: CPT | Performed by: STUDENT IN AN ORGANIZED HEALTH CARE EDUCATION/TRAINING PROGRAM

## 2025-01-22 PROCEDURE — 99285 EMERGENCY DEPT VISIT HI MDM: CPT

## 2025-01-22 PROCEDURE — 36415 COLL VENOUS BLD VENIPUNCTURE: CPT

## 2025-01-22 PROCEDURE — 80053 COMPREHEN METABOLIC PANEL: CPT

## 2025-01-22 PROCEDURE — 96360 HYDRATION IV INFUSION INIT: CPT

## 2025-01-22 PROCEDURE — 85025 COMPLETE CBC W/AUTO DIFF WBC: CPT

## 2025-01-22 PROCEDURE — 85379 FIBRIN DEGRADATION QUANT: CPT

## 2025-01-22 RX ORDER — LEVOTHYROXINE SODIUM 75 UG/1
75 TABLET ORAL
Status: DISCONTINUED | OUTPATIENT
Start: 2025-01-23 | End: 2025-01-23 | Stop reason: HOSPADM

## 2025-01-22 RX ORDER — VITAMIN B COMPLEX
5000 TABLET ORAL DAILY
Status: DISCONTINUED | OUTPATIENT
Start: 2025-01-23 | End: 2025-01-23 | Stop reason: HOSPADM

## 2025-01-22 RX ORDER — LOSARTAN POTASSIUM 50 MG/1
50 TABLET ORAL DAILY
Status: DISCONTINUED | OUTPATIENT
Start: 2025-01-23 | End: 2025-01-23 | Stop reason: HOSPADM

## 2025-01-22 RX ORDER — ONDANSETRON 4 MG/1
4 TABLET, ORALLY DISINTEGRATING ORAL EVERY 8 HOURS PRN
Status: DISCONTINUED | OUTPATIENT
Start: 2025-01-22 | End: 2025-01-23 | Stop reason: HOSPADM

## 2025-01-22 RX ORDER — SODIUM CHLORIDE 0.9 % (FLUSH) 0.9 %
5-40 SYRINGE (ML) INJECTION PRN
Status: DISCONTINUED | OUTPATIENT
Start: 2025-01-22 | End: 2025-01-23 | Stop reason: HOSPADM

## 2025-01-22 RX ORDER — POLYETHYLENE GLYCOL 3350 17 G/17G
17 POWDER, FOR SOLUTION ORAL DAILY PRN
Status: DISCONTINUED | OUTPATIENT
Start: 2025-01-22 | End: 2025-01-23 | Stop reason: HOSPADM

## 2025-01-22 RX ORDER — ACETAMINOPHEN 325 MG/1
650 TABLET ORAL EVERY 6 HOURS PRN
Status: DISCONTINUED | OUTPATIENT
Start: 2025-01-22 | End: 2025-01-23 | Stop reason: HOSPADM

## 2025-01-22 RX ORDER — EZETIMIBE 10 MG/1
10 TABLET ORAL NIGHTLY
Status: DISCONTINUED | OUTPATIENT
Start: 2025-01-22 | End: 2025-01-23 | Stop reason: HOSPADM

## 2025-01-22 RX ORDER — SODIUM CHLORIDE 9 MG/ML
INJECTION, SOLUTION INTRAVENOUS PRN
Status: DISCONTINUED | OUTPATIENT
Start: 2025-01-22 | End: 2025-01-23 | Stop reason: HOSPADM

## 2025-01-22 RX ORDER — ONDANSETRON 2 MG/ML
4 INJECTION INTRAMUSCULAR; INTRAVENOUS EVERY 6 HOURS PRN
Status: DISCONTINUED | OUTPATIENT
Start: 2025-01-22 | End: 2025-01-23 | Stop reason: HOSPADM

## 2025-01-22 RX ORDER — SODIUM CHLORIDE 0.9 % (FLUSH) 0.9 %
5-40 SYRINGE (ML) INJECTION EVERY 12 HOURS SCHEDULED
Status: DISCONTINUED | OUTPATIENT
Start: 2025-01-22 | End: 2025-01-23 | Stop reason: HOSPADM

## 2025-01-22 RX ORDER — CETIRIZINE HYDROCHLORIDE 10 MG/1
10 TABLET ORAL DAILY
Status: DISCONTINUED | OUTPATIENT
Start: 2025-01-23 | End: 2025-01-23 | Stop reason: HOSPADM

## 2025-01-22 RX ORDER — ACETAMINOPHEN 650 MG/1
650 SUPPOSITORY RECTAL EVERY 6 HOURS PRN
Status: DISCONTINUED | OUTPATIENT
Start: 2025-01-22 | End: 2025-01-23 | Stop reason: HOSPADM

## 2025-01-22 RX ORDER — ROSUVASTATIN CALCIUM 10 MG/1
10 TABLET, COATED ORAL NIGHTLY
Status: DISCONTINUED | OUTPATIENT
Start: 2025-01-22 | End: 2025-01-23 | Stop reason: HOSPADM

## 2025-01-22 RX ORDER — CHLORAL HYDRATE 500 MG
1 CAPSULE ORAL DAILY
Status: DISCONTINUED | OUTPATIENT
Start: 2025-01-23 | End: 2025-01-22 | Stop reason: RX

## 2025-01-22 RX ORDER — SODIUM CHLORIDE 9 MG/ML
INJECTION, SOLUTION INTRAVENOUS CONTINUOUS
Status: DISCONTINUED | OUTPATIENT
Start: 2025-01-22 | End: 2025-01-23 | Stop reason: HOSPADM

## 2025-01-22 RX ADMIN — SODIUM CHLORIDE: 9 INJECTION, SOLUTION INTRAVENOUS at 21:25

## 2025-01-22 ASSESSMENT — PAIN - FUNCTIONAL ASSESSMENT: PAIN_FUNCTIONAL_ASSESSMENT: NONE - DENIES PAIN

## 2025-01-22 NOTE — ED PROVIDER NOTES
[facility]  EMERGENCY DEPARTMENT ENCOUNTER      Pt Name: Radha Vargas  MRN: [id number]  Birthdate 1945  Date of evaluation: 1/22/2025  Provider: Kaelyn Nguyen DO    CHIEF COMPLAINT       Chief Complaint   Patient presents with    Near Syncope       PMH   Past Medical History:   Diagnosis Date    Bunionette of right foot     Exostosis of toe 2/27/2019    GERD (gastroesophageal reflux disease)     Hypertension          MDM:   Vitals:    Vitals:    01/22/25 1325   BP: 125/62   Pulse: 58   Resp: 23   Temp: 98 °F (36.7 °C)   SpO2: 100%           This is a 79 y.o. female with pmhx HTN, GERD, OA who presents today for cc of nearsyncope. Patient states she was at the [facility] gym today and felt nauseated, had stomach cramping which she states is typical for her and felt like she needed to defecate. She went and had a \"very large bowel movement with lots of diarrhea\" then went to go home. While trying to walk to the exit she felt nauseated again, flushed, and had an episode of uncontrollable diarrhea and vomiting that was NBNB. States she does have a history of \"tummy troubles\" but never this bad and not associated with feeling like she was going to pass out. States she got extremely lightheaded but did not fully lose consciousness, was lowered to the ground by bystanders. Denies any associated chest pain, palpitations, dyspnea. No longer having the abdominal cramping from before. Had a similar episode 3 weeks ago but was not evaluated. Is concerned as she is leaving on a cruise in 8 days [place] of the [place].      On arrival VS stable.   Physical Exam  General: Alert, no acute distress  HEENT: Normocephalic, atraumatic. EOMI, moist oral mucosa, no conjunctival injection  Neck: ROM normal, supple  Cardio: Heart regular rate and rhythm, cap refill <2seconds  Lungs: No respiratory distress, no wheezing, CTAB  Abdomen: Soft, nontender  MSK: ROM normal, no LE edema  Skin: Warm, dry, no

## 2025-01-22 NOTE — ED TRIAGE NOTES
Patient was entering gym this morning and needed to go to bathroom and had diarrhea and vomiting. Patient reports feeling like she had \"low blood sugar\". Reports feeling better at this time.     Similar episode a few weeks ago at home while having a bowel movement as well.

## 2025-01-22 NOTE — ED NOTES
Verbal report given to Jess Witt RN. Report included SBAR, ED summary, vitals, and Lab/diagnostic results

## 2025-01-22 NOTE — ED NOTES
Pt care given to [facility]. Pt was taken off the unit  via stretcher to [facility].

## 2025-01-23 ENCOUNTER — APPOINTMENT (OUTPATIENT)
Facility: HOSPITAL | Age: 80
End: 2025-01-23
Attending: FAMILY MEDICINE
Payer: MEDICARE

## 2025-01-23 ENCOUNTER — APPOINTMENT (OUTPATIENT)
Facility: HOSPITAL | Age: 80
End: 2025-01-23
Payer: MEDICARE

## 2025-01-23 VITALS
SYSTOLIC BLOOD PRESSURE: 133 MMHG | HEART RATE: 68 BPM | RESPIRATION RATE: 18 BRPM | TEMPERATURE: 98.1 F | DIASTOLIC BLOOD PRESSURE: 66 MMHG | HEIGHT: 62 IN | OXYGEN SATURATION: 97 % | WEIGHT: 165.57 LBS | BODY MASS INDEX: 30.47 KG/M2

## 2025-01-23 LAB
ALBUMIN SERPL-MCNC: 3.6 G/DL (ref 3.5–5)
ALBUMIN/GLOB SERPL: 1.5 (ref 1.1–2.2)
ALP SERPL-CCNC: 45 U/L (ref 45–117)
ALT SERPL-CCNC: 28 U/L (ref 12–78)
ANION GAP SERPL CALC-SCNC: 6 MMOL/L (ref 2–12)
APTT PPP: 22 SEC (ref 22.1–31)
AST SERPL-CCNC: 22 U/L (ref 15–37)
BASOPHILS # BLD: 0.04 K/UL (ref 0–0.1)
BASOPHILS NFR BLD: 0.5 % (ref 0–1)
BILIRUB SERPL-MCNC: 0.8 MG/DL (ref 0.2–1)
BUN SERPL-MCNC: 21 MG/DL (ref 6–20)
BUN/CREAT SERPL: 34 (ref 12–20)
CALCIUM SERPL-MCNC: 8.9 MG/DL (ref 8.5–10.1)
CHLORIDE SERPL-SCNC: 110 MMOL/L (ref 97–108)
CO2 SERPL-SCNC: 24 MMOL/L (ref 21–32)
CREAT SERPL-MCNC: 0.62 MG/DL (ref 0.55–1.02)
DIFFERENTIAL METHOD BLD: NORMAL
ECHO AO ASC DIAM: 2.8 CM
ECHO AO ASCENDING AORTA INDEX: 1.59 CM/M2
ECHO AO ROOT DIAM: 2.9 CM
ECHO AO ROOT INDEX: 1.65 CM/M2
ECHO AV AREA PEAK VELOCITY: 3.3 CM2
ECHO AV AREA VTI: 3.2 CM2
ECHO AV AREA/BSA PEAK VELOCITY: 1.9 CM2/M2
ECHO AV AREA/BSA VTI: 1.8 CM2/M2
ECHO AV MEAN GRADIENT: 5 MMHG
ECHO AV MEAN VELOCITY: 1.1 M/S
ECHO AV PEAK GRADIENT: 9 MMHG
ECHO AV PEAK VELOCITY: 1.5 M/S
ECHO AV VELOCITY RATIO: 0.93
ECHO AV VTI: 30.6 CM
ECHO BSA: 1.81 M2
ECHO EST RA PRESSURE: 3 MMHG
ECHO LA DIAMETER INDEX: 1.88 CM/M2
ECHO LA DIAMETER: 3.3 CM
ECHO LA TO AORTIC ROOT RATIO: 1.14
ECHO LV E' LATERAL VELOCITY: 9.63 CM/S
ECHO LV E' SEPTAL VELOCITY: 8.7 CM/S
ECHO LV EF PHYSICIAN: 55 %
ECHO LV FRACTIONAL SHORTENING: 34 % (ref 28–44)
ECHO LV INTERNAL DIMENSION DIASTOLE INDEX: 1.99 CM/M2
ECHO LV INTERNAL DIMENSION DIASTOLIC: 3.5 CM (ref 3.9–5.3)
ECHO LV INTERNAL DIMENSION SYSTOLIC INDEX: 1.31 CM/M2
ECHO LV INTERNAL DIMENSION SYSTOLIC: 2.3 CM
ECHO LV IVSD: 0.9 CM (ref 0.6–0.9)
ECHO LV MASS 2D: 88.8 G (ref 67–162)
ECHO LV MASS INDEX 2D: 50.5 G/M2 (ref 43–95)
ECHO LV POSTERIOR WALL DIASTOLIC: 0.9 CM (ref 0.6–0.9)
ECHO LV RELATIVE WALL THICKNESS RATIO: 0.51
ECHO LVOT AREA: 3.5 CM2
ECHO LVOT AV VTI INDEX: 0.93
ECHO LVOT DIAM: 2.1 CM
ECHO LVOT MEAN GRADIENT: 5 MMHG
ECHO LVOT PEAK GRADIENT: 8 MMHG
ECHO LVOT PEAK VELOCITY: 1.4 M/S
ECHO LVOT STROKE VOLUME INDEX: 56.3 ML/M2
ECHO LVOT SV: 99 ML
ECHO LVOT VTI: 28.6 CM
ECHO MV A VELOCITY: 0.72 M/S
ECHO MV AREA PHT: 3.6 CM2
ECHO MV AREA VTI: 3.7 CM2
ECHO MV E DECELERATION TIME (DT): 210.4 MS
ECHO MV E VELOCITY: 0.74 M/S
ECHO MV E/A RATIO: 1.03
ECHO MV E/E' LATERAL: 7.68
ECHO MV E/E' RATIO (AVERAGED): 8.1
ECHO MV E/E' SEPTAL: 8.51
ECHO MV LVOT VTI INDEX: 0.93
ECHO MV MAX VELOCITY: 0.9 M/S
ECHO MV MEAN GRADIENT: 1 MMHG
ECHO MV MEAN VELOCITY: 0.5 M/S
ECHO MV PEAK GRADIENT: 3 MMHG
ECHO MV PRESSURE HALF TIME (PHT): 61 MS
ECHO MV VTI: 26.6 CM
ECHO RIGHT VENTRICULAR SYSTOLIC PRESSURE (RVSP): 21 MMHG
ECHO RV INTERNAL DIMENSION: 3.8 CM
ECHO RV TAPSE: 2.1 CM (ref 1.7–?)
ECHO TV REGURGITANT MAX VELOCITY: 2.14 M/S
ECHO TV REGURGITANT PEAK GRADIENT: 18 MMHG
EKG ATRIAL RATE: 55 BPM
EKG DIAGNOSIS: NORMAL
EKG P AXIS: 27 DEGREES
EKG P-R INTERVAL: 142 MS
EKG Q-T INTERVAL: 416 MS
EKG QRS DURATION: 108 MS
EKG QTC CALCULATION (BAZETT): 397 MS
EKG R AXIS: -38 DEGREES
EKG T AXIS: 17 DEGREES
EKG VENTRICULAR RATE: 55 BPM
EOSINOPHIL # BLD: 0.12 K/UL (ref 0–0.4)
EOSINOPHIL NFR BLD: 1.6 % (ref 0–7)
ERYTHROCYTE [DISTWIDTH] IN BLOOD BY AUTOMATED COUNT: 12.6 % (ref 11.5–14.5)
GLOBULIN SER CALC-MCNC: 2.4 G/DL (ref 2–4)
GLUCOSE SERPL-MCNC: 88 MG/DL (ref 65–100)
HCT VFR BLD AUTO: 39.6 % (ref 35–47)
HGB BLD-MCNC: 13.3 G/DL (ref 11.5–16)
IMM GRANULOCYTES # BLD AUTO: 0.04 K/UL (ref 0–0.04)
IMM GRANULOCYTES NFR BLD AUTO: 0.5 % (ref 0–0.5)
INR PPP: 1 (ref 0.9–1.1)
LYMPHOCYTES # BLD: 2.21 K/UL (ref 0.8–3.5)
LYMPHOCYTES NFR BLD: 30.2 % (ref 12–49)
MAGNESIUM SERPL-MCNC: 2 MG/DL (ref 1.6–2.4)
MCH RBC QN AUTO: 30.9 PG (ref 26–34)
MCHC RBC AUTO-ENTMCNC: 33.6 G/DL (ref 30–36.5)
MCV RBC AUTO: 91.9 FL (ref 80–99)
MONOCYTES # BLD: 0.71 K/UL (ref 0–1)
MONOCYTES NFR BLD: 9.7 % (ref 5–13)
NEUTS SEG # BLD: 4.2 K/UL (ref 1.8–8)
NEUTS SEG NFR BLD: 57.5 % (ref 32–75)
NRBC # BLD: 0 K/UL (ref 0–0.01)
NRBC BLD-RTO: 0 PER 100 WBC
PLATELET # BLD AUTO: 150 K/UL (ref 150–400)
PMV BLD AUTO: 11.7 FL (ref 8.9–12.9)
POTASSIUM SERPL-SCNC: 4.2 MMOL/L (ref 3.5–5.1)
PROT SERPL-MCNC: 6 G/DL (ref 6.4–8.2)
PROTHROMBIN TIME: 10.9 SEC (ref 9.2–11.2)
RBC # BLD AUTO: 4.31 M/UL (ref 3.8–5.2)
SODIUM SERPL-SCNC: 140 MMOL/L (ref 136–145)
THERAPEUTIC RANGE: ABNORMAL SECS (ref 58–77)
WBC # BLD AUTO: 7.3 K/UL (ref 3.6–11)

## 2025-01-23 PROCEDURE — 6370000000 HC RX 637 (ALT 250 FOR IP): Performed by: FAMILY MEDICINE

## 2025-01-23 PROCEDURE — 96361 HYDRATE IV INFUSION ADD-ON: CPT

## 2025-01-23 PROCEDURE — G0378 HOSPITAL OBSERVATION PER HR: HCPCS

## 2025-01-23 PROCEDURE — 83735 ASSAY OF MAGNESIUM: CPT

## 2025-01-23 PROCEDURE — 80053 COMPREHEN METABOLIC PANEL: CPT

## 2025-01-23 PROCEDURE — 85025 COMPLETE CBC W/AUTO DIFF WBC: CPT

## 2025-01-23 PROCEDURE — 6360000004 HC RX CONTRAST MEDICATION: Performed by: FAMILY MEDICINE

## 2025-01-23 PROCEDURE — 86231 EMA EACH IG CLASS: CPT

## 2025-01-23 PROCEDURE — 70450 CT HEAD/BRAIN W/O DYE: CPT

## 2025-01-23 PROCEDURE — 85610 PROTHROMBIN TIME: CPT

## 2025-01-23 PROCEDURE — 82784 ASSAY IGA/IGD/IGG/IGM EACH: CPT

## 2025-01-23 PROCEDURE — 86364 TISS TRNSGLTMNASE EA IG CLAS: CPT

## 2025-01-23 PROCEDURE — 74177 CT ABD & PELVIS W/CONTRAST: CPT

## 2025-01-23 PROCEDURE — 93306 TTE W/DOPPLER COMPLETE: CPT

## 2025-01-23 PROCEDURE — 85730 THROMBOPLASTIN TIME PARTIAL: CPT

## 2025-01-23 PROCEDURE — 2580000003 HC RX 258: Performed by: FAMILY MEDICINE

## 2025-01-23 RX ORDER — IOPAMIDOL 755 MG/ML
100 INJECTION, SOLUTION INTRAVASCULAR
Status: COMPLETED | OUTPATIENT
Start: 2025-01-23 | End: 2025-01-23

## 2025-01-23 RX ORDER — COLESTIPOL HYDROCHLORIDE 1 G/1
1 TABLET ORAL DAILY
Qty: 60 TABLET | Refills: 1 | Status: SHIPPED | OUTPATIENT
Start: 2025-01-23

## 2025-01-23 RX ADMIN — SODIUM CHLORIDE: 9 INJECTION, SOLUTION INTRAVENOUS at 13:19

## 2025-01-23 RX ADMIN — Medication 5000 UNITS: at 09:27

## 2025-01-23 RX ADMIN — LOSARTAN POTASSIUM 50 MG: 50 TABLET, FILM COATED ORAL at 09:27

## 2025-01-23 RX ADMIN — LEVOTHYROXINE SODIUM 75 MCG: 0.07 TABLET ORAL at 06:36

## 2025-01-23 RX ADMIN — CETIRIZINE HYDROCHLORIDE 10 MG: 10 TABLET, FILM COATED ORAL at 09:27

## 2025-01-23 RX ADMIN — IOPAMIDOL 100 ML: 755 INJECTION, SOLUTION INTRAVENOUS at 03:32

## 2025-01-23 NOTE — PROGRESS NOTES
Denies abdominal pain, vomiting or diarrhea  Appears this has resolved  CT A/P negative  Orthostatics negative  Patient sitting up in bed eating breakfast,  at bedside    Pending GI consult  Pending echo  Likely discharge later today    Leticia Menendez, -[facility] Medicine

## 2025-01-23 NOTE — H&P
History and Physical    Date of Service:  1/22/2025  Primary Care Provider: Vineet Witt MD  Source of information: The patient and Chart review    Chief Complaint:   diarrhea, nausea, vomiting, lightheadedness; felt like going to pass out      History of Presenting Illness:   Radha Vargas is a 79 y.o. female with past medical history of hypertension, GERD, osteoarthritis presented as a direct admission/transfer from [facility] ED to [facility] with chief complaints of diarrhea, nausea, vomiting, lightheadedness and feeling like going to pass out (near syncope).  Patient reports that earlier today she ate a hamburger, felt like she needed to defecate, went to the gym, started having profuse diarrhea with large bowel movement with loose nonbloody stool, nausea and vomiting (mostly dry heaves), abdominal cramping (slight left lower quadrant abdominal pain).  She notes that she then sat down on the sofa in a more public space, as she started to feel lightheaded like she was going to pass out (with near syncope).  She notes that she then tried to stand up, felt more lightheaded, and was assisted back down by 5 bystander at the gym.  EMS was called and she was transported to [facility] ED.  She reports having similar symptoms of diarrhea, nausea, vomiting, and then near syncopal episodes within the past 3 to 4 weeks.  She does not report any slurred speech, facial droop, focal weakness, new onset numbness/paresthesias, visual disturbance.  She reports having prior MRI of the brain due to balance problems and was told that she had a \"old stroke\".  She does not report any hematemesis, melena, or hematochezia.  She does not report any dysuria or hematuria.  As [facility] ED, initial recorded vital signs were temperature 98.0 °F, /62, heart rate 58, respiratory rate 23, O2 saturation 100% on room air.  12-lead EKG shows sinus bradycardia, LVH, ST/T wave changes septal leads at 55 beats a

## 2025-01-23 NOTE — CONSULTS
[facility]                         GASTROENTEROLOGY CONSULTATION NOTE              NAME:  Radha Vargas   :   1945   MRN:   [id number]       Referring Physician:    Dr. Ojeda     Consult Date:   2025 1:27 PM    Chief Complaint:    Near syncope     History of Present Illness:    Patient is a 79 y.o. female with hx of HTN, gerd, osteoarthritis admitted for diarrhea, nausea, vomiting and near syncope.   Notes yesterday at the gym felt abd cramping followed by diarrhea, vomiting and lightheadedness.  In ED, unremarkable cbc and cmp.   CT head and abd/pelv no acute abnormality   Awaiting cardiac echo today     Sx have resolved today.     She notes many year history of loose stools and abdominal cramping. She has been seen at the [facility] end[facility] for years. Has a hx of colon polyps and flex sig 2 years ago.   Notes she has loose stools daily, following every meal. No hematochezia or melena. No recent weight loss, nausea, vomiting. Reports she has had random colon bx in the past and negative for celiac disease 10+ years ago. Previous cholecystectomy. Has not been diagnosed with IBS that she knows of.        PMH:  Past Medical History:   Diagnosis Date    Bunionette of right foot     Exostosis of toe 2019    GERD (gastroesophageal reflux disease)     Hypertension        PSH:  Past Surgical History:   Procedure Laterality Date    BREAST BIOPSY Right     bennign    COLONOSCOPY      FOOT SURGERY      GI      colonscopy    GYN      D&C, hysterectomy    HAND SURGERY      HEENT      T&A    ORTHOPEDIC SURGERY      LEFT HAND       Allergies:  No Known Allergies    Home Medications:  Prior to Admission Medications   Prescriptions Last Dose Informant Patient Reported? Taking?   Aspirin-Omeprazole 81-40 MG TBEC   Yes No   Sig: Take by mouth   Glucosamine Sulfate 1000 MG TABS   Yes No   Sig: Take 1,500 mg by mouth daily   Hyoscyamine Sulfate SL 0.125 MG SUBL   Yes No   Sig: Place

## 2025-01-23 NOTE — PROGRESS NOTES
Notified Dr. Rios that patient has arrived from [place] and will need orders. Pt had diet order, gave pt dinner tray. Vitals stable, pt alert and oriented.

## 2025-01-23 NOTE — DISCHARGE INSTRUCTIONS
Discharge Instructions       PATIENT ID: Radha Vargas  MRN: [id number]   YOB: 1945    DATE OF ADMISSION: 1/22/2025   DATE OF DISCHARGE: 1/23/2025    PRIMARY CARE PROVIDER: Vineet Witt     ATTENDING PHYSICIAN: Tucker Ojeda MD   DISCHARGING PROVIDER: KELLY Ellis NP    To contact this individual call [phone] and ask the  to page.   If unavailable ask to be transferred the Adult Hospitalist Department.    DISCHARGE DIAGNOSES: Abdominal pain, Near syncope    CONSULTATIONS: =Gastroenterolgy    PROCEDURES/SURGERIES: * No surgery found *    PENDING TEST RESULTS:   At the time of discharge the following test results are still pending:     FOLLOW UP APPOINTMENTS:    GI    ADDITIONAL CARE RECOMMENDATIONS:   Your acute symptoms have resolved   CT scan of abdomen was negative  Follow up Celiac panel  Follow up fecal pancreatic elastase (If you provided a stool sample)  Follow up with YASMINE Dos Santos in one week. Her office should reach out to you, if they do not please call the office to schedule appointment  Gastroenterology recommends colestipol 1 gram daily  Your echo was normal  You can see your results on MyChart    DIET: Regular    ACTIVITY: activity as tolerated    WOUND CARE: none    EQUIPMENT needed: none      DISCHARGE MEDICATIONS:   See Medication Reconciliation Form    It is important that you take the medication exactly as they are prescribed.   Keep your medication in the bottles provided by the pharmacist and keep a list of the medication names, dosages, and times to be taken in your wallet.   Do not take other medications without consulting your doctor.       NOTIFY YOUR PHYSICIAN FOR ANY OF THE FOLLOWING:   Fever over 101 degrees for 24 hours.   Chest pain, shortness of breath, fever, chills, nausea, vomiting, diarrhea, change in mentation, falling, weakness, bleeding. Severe pain or pain not relieved by medications.  Or, any other signs or symptoms

## 2025-01-23 NOTE — PROGRESS NOTES
[facility]                                                                                          Hospitalist Progress Note  KELLY Ellis NP  Office Phone: [phone]        Date of Service:  2025  NAME:  Radha Vargas  :  1945  MRN:  [id number]       Admission Summary:   Radha Vargas is a 79 y.o. female with past medical history of hypertension, GERD, osteoarthritis presented as a direct admission/transfer from [facility] ED to [facility] with chief complaints of diarrhea, nausea, vomiting, lightheadedness and feeling like going to pass out (near syncope).  Patient reports that earlier today she ate a hamburger, felt like she needed to defecate, went to the gym, started having profuse diarrhea with large bowel movement with loose nonbloody stool, nausea and vomiting (mostly dry heaves), abdominal cramping (slight left lower quadrant abdominal pain).  She notes that she then sat down on the sofa in a more public space, as she started to feel lightheaded like she was going to pass out (with near syncope).  She notes that she then tried to stand up, felt more lightheaded, and was assisted back down by 5 bystander at the gym.  EMS was called and she was transported to [facility] ED.  She reports having similar symptoms of diarrhea, nausea, vomiting, and then near syncopal episodes within the past 3 to 4 weeks.  She does not report any slurred speech, facial droop, focal weakness, new onset numbness/paresthesias, visual disturbance.  She reports having prior MRI of the brain due to balance problems and was told that she had a \"old stroke\".  She does not report any hematemesis, melena, or hematochezia.  She does not report any dysuria or hematuria.  As [facility] ED, initial recorded vital signs were temperature 98.0 °F, /62, heart rate 58, respiratory rate 23, O2 saturation 100% on room air.  12-lead EKG shows sinus

## 2025-01-23 NOTE — DISCHARGE SUMMARY
FOLLOW UP APPOINTMENTS:    GI    ADDITIONAL CARE RECOMMENDATIONS:   Your acute symptoms have resolved   CT scan of abdomen was negative  Follow up Celiac panel  Follow up fecal pancreatic elastase (If you provided a stool sample)  Follow up with YASMINE Dos Santos in one week. Her office should reach out to you, if they do not please call the office to schedule appointment  Gastroenterology recommends colestipol 1 gram daily  Your echo was normal  You can see your results on MyChart    DIET: Regular    ACTIVITY: activity as tolerated    WOUND CARE: none    EQUIPMENT needed: none      DISCHARGE MEDICATIONS:     Medication List        STOP taking these medications      aspirin 81 MG EC tablet     Aspirin-Omeprazole 81-40 MG Tbec     Glucosamine Sulfate 1000 MG Tabs            ASK your doctor about these medications      Crestor 10 MG tablet  Generic drug: rosuvastatin     ezetimibe 10 MG tablet  Commonly known as: ZETIA     fish oil 1000 MG capsule     Hyoscyamine Sulfate SL 0.125 MG Subl     * levothyroxine 100 MCG tablet  Commonly known as: SYNTHROID     * levothyroxine 75 MCG tablet  Commonly known as: SYNTHROID     * levothyroxine 50 MCG tablet  Commonly known as: SYNTHROID     loratadine 10 MG tablet  Commonly known as: CLARITIN     losartan 50 MG tablet  Commonly known as: COZAAR     raNITIdine 150 MG capsule  Commonly known as: ZANTAC     rifAXIMin 550 MG tablet  Commonly known as: XIFAXAN     vitamin D3 125 MCG (5000 UT) Tabs tablet  Commonly known as: CHOLECALCIFEROL           * This list has 3 medication(s) that are the same as other medications prescribed for you. Read the directions carefully, and ask your doctor or other care provider to review them with you.                    NOTIFY YOUR PHYSICIAN FOR ANY OF THE FOLLOWING:   Fever over 101 degrees for 24 hours.   Chest pain, shortness of breath, fever, chills, nausea, vomiting, diarrhea, change in mentation, falling, weakness, bleeding. Severe

## 2025-01-25 LAB
ENDOMYSIUM IGA SER QL: NEGATIVE
IGA SERPL-MCNC: 151 MG/DL (ref 64–422)
TTG IGA SER-ACNC: <2 U/ML (ref 0–3)

## 2025-05-06 ENCOUNTER — HOSPITAL ENCOUNTER (EMERGENCY)
Facility: HOSPITAL | Age: 80
Discharge: HOME | End: 2025-05-06
Attending: EMERGENCY MEDICINE
Payer: MEDICARE

## 2025-05-06 ENCOUNTER — APPOINTMENT (OUTPATIENT)
Facility: HOSPITAL | Age: 80
End: 2025-05-06
Payer: MEDICARE

## 2025-05-06 VITALS
TEMPERATURE: 97.8 F | WEIGHT: 172.4 LBS | HEART RATE: 68 BPM | OXYGEN SATURATION: 100 % | HEIGHT: 62 IN | BODY MASS INDEX: 31.73 KG/M2 | RESPIRATION RATE: 21 BRPM | SYSTOLIC BLOOD PRESSURE: 159 MMHG | DIASTOLIC BLOOD PRESSURE: 95 MMHG

## 2025-05-06 DIAGNOSIS — R20.2 PARESTHESIA: Primary | ICD-10-CM

## 2025-05-06 LAB
ALBUMIN SERPL-MCNC: 3.8 G/DL (ref 3.5–5)
ALBUMIN/GLOB SERPL: 1.2 (ref 1.1–2.2)
ALP SERPL-CCNC: 58 U/L (ref 45–117)
ALT SERPL-CCNC: 44 U/L (ref 12–78)
ANION GAP SERPL CALC-SCNC: 12 MMOL/L (ref 2–12)
APPEARANCE UR: CLEAR
AST SERPL-CCNC: 37 U/L (ref 15–37)
BACTERIA URNS QL MICRO: NEGATIVE /HPF
BASOPHILS # BLD: 0.04 K/UL (ref 0–0.1)
BASOPHILS NFR BLD: 0.7 % (ref 0–1)
BILIRUB SERPL-MCNC: 0.9 MG/DL (ref 0.2–1)
BILIRUB UR QL: NEGATIVE
BUN SERPL-MCNC: 21 MG/DL (ref 6–20)
BUN/CREAT SERPL: 28 (ref 12–20)
CALCIUM SERPL-MCNC: 9.2 MG/DL (ref 8.5–10.1)
CHLORIDE SERPL-SCNC: 102 MMOL/L (ref 97–108)
CO2 SERPL-SCNC: 26 MMOL/L (ref 21–32)
COLOR UR: NORMAL
CREAT SERPL-MCNC: 0.75 MG/DL (ref 0.55–1.02)
DIFFERENTIAL METHOD BLD: NORMAL
EKG ATRIAL RATE: 63 BPM
EKG DIAGNOSIS: NORMAL
EKG P AXIS: -1 DEGREES
EKG P-R INTERVAL: 140 MS
EKG Q-T INTERVAL: 416 MS
EKG QRS DURATION: 100 MS
EKG QTC CALCULATION (BAZETT): 425 MS
EKG R AXIS: -41 DEGREES
EKG T AXIS: 14 DEGREES
EKG VENTRICULAR RATE: 63 BPM
EOSINOPHIL # BLD: 0.14 K/UL (ref 0–0.4)
EOSINOPHIL NFR BLD: 2.4 % (ref 0–7)
EPITH CASTS URNS QL MICRO: NORMAL /LPF
ERYTHROCYTE [DISTWIDTH] IN BLOOD BY AUTOMATED COUNT: 12.3 % (ref 11.5–14.5)
GLOBULIN SER CALC-MCNC: 3.2 G/DL (ref 2–4)
GLUCOSE SERPL-MCNC: 105 MG/DL (ref 65–100)
GLUCOSE UR STRIP.AUTO-MCNC: NEGATIVE MG/DL
HCT VFR BLD AUTO: 41.9 % (ref 35–47)
HGB BLD-MCNC: 13.6 G/DL (ref 11.5–16)
HGB UR QL STRIP: NEGATIVE
IMM GRANULOCYTES # BLD AUTO: 0.03 K/UL (ref 0–0.04)
IMM GRANULOCYTES NFR BLD AUTO: 0.5 % (ref 0–0.5)
KETONES UR QL STRIP.AUTO: NEGATIVE MG/DL
LEUKOCYTE ESTERASE UR QL STRIP.AUTO: NEGATIVE
LYMPHOCYTES # BLD: 2.02 K/UL (ref 0.8–3.5)
LYMPHOCYTES NFR BLD: 34.1 % (ref 12–49)
MAGNESIUM SERPL-MCNC: 1.9 MG/DL (ref 1.6–2.4)
MCH RBC QN AUTO: 31.1 PG (ref 26–34)
MCHC RBC AUTO-ENTMCNC: 32.5 G/DL (ref 30–36.5)
MCV RBC AUTO: 95.7 FL (ref 80–99)
MONOCYTES # BLD: 0.61 K/UL (ref 0–1)
MONOCYTES NFR BLD: 10.3 % (ref 5–13)
NEUTS SEG # BLD: 3.08 K/UL (ref 1.8–8)
NEUTS SEG NFR BLD: 52 % (ref 32–75)
NITRITE UR QL STRIP.AUTO: NEGATIVE
NRBC # BLD: 0 K/UL (ref 0–0.01)
NRBC BLD-RTO: 0 PER 100 WBC
PH UR STRIP: 6 (ref 5–8)
PLATELET # BLD AUTO: 153 K/UL (ref 150–400)
PMV BLD AUTO: 12.4 FL (ref 8.9–12.9)
POTASSIUM SERPL-SCNC: 4.2 MMOL/L (ref 3.5–5.1)
PROT SERPL-MCNC: 7 G/DL (ref 6.4–8.2)
PROT UR STRIP-MCNC: NEGATIVE MG/DL
RBC # BLD AUTO: 4.38 M/UL (ref 3.8–5.2)
RBC #/AREA URNS HPF: NORMAL /HPF (ref 0–5)
SODIUM SERPL-SCNC: 140 MMOL/L (ref 136–145)
SP GR UR REFRACTOMETRY: <1.005 (ref 1–1.03)
TROPONIN I SERPL HS-MCNC: <4 NG/L (ref 0–51)
UROBILINOGEN UR QL STRIP.AUTO: 0.2 EU/DL (ref 0.2–1)
WBC # BLD AUTO: 5.9 K/UL (ref 3.6–11)
WBC URNS QL MICRO: NORMAL /HPF (ref 0–4)

## 2025-05-06 PROCEDURE — 99284 EMERGENCY DEPT VISIT MOD MDM: CPT

## 2025-05-06 PROCEDURE — 80053 COMPREHEN METABOLIC PANEL: CPT

## 2025-05-06 PROCEDURE — 81001 URINALYSIS AUTO W/SCOPE: CPT

## 2025-05-06 PROCEDURE — 83735 ASSAY OF MAGNESIUM: CPT

## 2025-05-06 PROCEDURE — 93005 ELECTROCARDIOGRAM TRACING: CPT | Performed by: EMERGENCY MEDICINE

## 2025-05-06 PROCEDURE — 84484 ASSAY OF TROPONIN QUANT: CPT

## 2025-05-06 PROCEDURE — 70450 CT HEAD/BRAIN W/O DYE: CPT

## 2025-05-06 PROCEDURE — 85025 COMPLETE CBC W/AUTO DIFF WBC: CPT

## 2025-05-06 ASSESSMENT — PAIN DESCRIPTION - ORIENTATION: ORIENTATION: RIGHT

## 2025-05-06 ASSESSMENT — PAIN DESCRIPTION - DESCRIPTORS: DESCRIPTORS: NUMBNESS

## 2025-05-06 ASSESSMENT — PAIN DESCRIPTION - LOCATION: LOCATION: SHOULDER

## 2025-05-06 NOTE — ED PROVIDER NOTES
[facility]  EMERGENCY DEPARTMENT ENCOUNTER      Patient Name: Radha Vargas  MRN: [id number]  Birthdate 1945  Date of Evaluation: 5/6/2025  Physician: Yadiel Geiger MD    CHIEF COMPLAINT       Chief Complaint   Patient presents with    Numbness    Diarrhea       HISTORY OF PRESENT ILLNESS   (Location/Symptom, Timing/Onset, Context/Setting, Quality, Duration, Modifying Factors, Severity)   Radha Vargas, 79 y.o., female     79-year-old female with a history of arthritis, GERD, hypertension, hypercholesterolemia presents with a chief complaint of right arm numbness and not feeling well.  Patient reports that the right arm numbness has been ongoing for over a month.  She has had MRI imaging and been seen by orthopedics.  She states that this morning she started having sensation of not feeling well.  She has had this on several occasions in the past.          Nursing Notes were reviewed.    REVIEW OF SYSTEMS    (Not required)   Review of Systems    Except as noted above the remainder of the review of systems was reviewed and negative.     PAST MEDICAL HISTORY     Past Medical History:   Diagnosis Date    Arthritis     Bunionette of right foot     Exostosis of toe 2/27/2019    GERD (gastroesophageal reflux disease)     Hypercholesteremia     Hypertension        SURGICAL HISTORY       Past Surgical History:   Procedure Laterality Date    BREAST BIOPSY Right     bennign    COLONOSCOPY      FOOT SURGERY      GI      colonscopy    GYN      D&C, hysterectomy    HAND SURGERY      HEENT      T&A    ORTHOPEDIC SURGERY      LEFT HAND       CURRENT MEDICATIONS       Discharge Medication List as of 5/6/2025 11:54 AM        CONTINUE these medications which have NOT CHANGED    Details   colestipol (COLESTID) 1 g tablet Take 1 tablet by mouth daily, Disp-60 tablet, R-1Normal      ezetimibe (ZETIA) 10 MG tablet Take 1 tablet by mouth dailyHistorical Med      Omega-3 Fatty Acids (FISH OIL) 1000 MG

## 2025-05-06 NOTE — ED TRIAGE NOTES
Patient arrives with cc of tingling, numbness to the R shoulder. Patient reports hx of imaging that shows arthritis of neck. Patient reports she was sent for a MRI but it has not been read.   Patient reports a few wks ago she had a low BP reading on her watch. Patient reports she feels out of sort. Patient reports diarrhea this morning. Patient arrives via wheelchair, A & O  x 4, and pov. Patient was able to ambulate from chair to stretcher.

## (undated) DEVICE — Device

## (undated) DEVICE — REM POLYHESIVE ADULT PATIENT RETURN ELECTRODE: Brand: VALLEYLAB

## (undated) DEVICE — STRETCH BANDAGE ROLL: Brand: DERMACEA

## (undated) DEVICE — SUTURE VCRL 2-0 L27IN ABSRB UD PS-2 L19MM 1/2 CIR J428H

## (undated) DEVICE — INFECTION CONTROL KIT SYS

## (undated) DEVICE — SYR IRR BLB 2OZ DISP BLU STRL -- CONVERT TO ITEM 357637

## (undated) DEVICE — BLADE SAW SAG CRS TOOTH 5.8MM

## (undated) DEVICE — NEEDLE HYPO 27GA L1.25IN GRY POLYPR HUB S STL REG BVL STR

## (undated) DEVICE — PENCIL ES L3M BTTN SWCH S STL HEX LOK BLDE ELECTRD HOLSTER

## (undated) DEVICE — SUTURE VCRL SZ 3-0 L27IN ABSRB UD FS-2 L19MM 1/2 CIR J423H

## (undated) DEVICE — HANDLE LT SNAP ON ULT DURABLE LENS FOR TRUMPF ALC DISPOSABLE

## (undated) DEVICE — SST BUR, EGG, 14 FLUTES X 6MM DIA.: Brand: MICROAIRE®

## (undated) DEVICE — INTENDED FOR TISSUE SEPARATION, AND OTHER PROCEDURES THAT REQUIRE A SHARP SURGICAL BLADE TO PUNCTURE OR CUT.: Brand: BARD-PARKER ® CARBON RIB-BACK BLADES

## (undated) DEVICE — STERILE POLYISOPRENE POWDER-FREE SURGICAL GLOVES: Brand: PROTEXIS

## (undated) DEVICE — SOLUTION IV 1000ML 0.9% SOD CHL

## (undated) DEVICE — NDL PRT INJ NSAF BLNT 18GX1.5 --

## (undated) DEVICE — HOOK LOCK LATEX FREE ELASTIC BANDAGE 3INX5YD

## (undated) DEVICE — KENDALL SCD EXPRESS SLEEVES, KNEE LENGTH, MEDIUM: Brand: KENDALL SCD

## (undated) DEVICE — SUTURE VCRL SZ 0 L27IN ABSRB UD L26MM CT-2 1/2 CIR J270H

## (undated) DEVICE — SUTURE ETHLN SZ 4-0 L18IN NONABSORBABLE BLK L19MM PS-2 3/8 1667H

## (undated) DEVICE — SUT VCRL 4-0 18IN FS2 VIO --

## (undated) DEVICE — SUT ETHLN 3-0 18IN PS2 BLK --

## (undated) DEVICE — SUTURE COAT VCRL SZ 4-0 L18IN ABSRB UD L19MM PS-2 1/2 CIR J496G

## (undated) DEVICE — PACK,BASIC,SIRUS,V: Brand: MEDLINE

## (undated) DEVICE — CURITY NON-ADHERENT STRIPS: Brand: CURITY

## (undated) DEVICE — TRAY PREP DRY W/ PREM GLV 2 APPL 6 SPNG 2 UNDPD 1 OVERWRAP

## (undated) DEVICE — SYR 3ML LL TIP 1/10ML GRAD --

## (undated) DEVICE — SUTURE ETHLN SZ 4-0 L18IN NONABSORBABLE BLK L16MM PC-3 3/8 1864G

## (undated) DEVICE — STOCKINETTE: Brand: DEROYAL

## (undated) DEVICE — DRAPE,EXTREMITY,89X128,STERILE: Brand: MEDLINE

## (undated) DEVICE — GAUZE SPONGES,12 PLY: Brand: CURITY